# Patient Record
Sex: FEMALE | Race: WHITE
[De-identification: names, ages, dates, MRNs, and addresses within clinical notes are randomized per-mention and may not be internally consistent; named-entity substitution may affect disease eponyms.]

---

## 2018-05-18 ENCOUNTER — HOSPITAL ENCOUNTER (OUTPATIENT)
Dept: HOSPITAL 89 - MAMO | Age: 63
End: 2018-05-18
Attending: OBSTETRICS & GYNECOLOGY
Payer: COMMERCIAL

## 2018-05-18 DIAGNOSIS — Z12.31: Primary | ICD-10-CM

## 2018-05-18 PROCEDURE — 77067 SCR MAMMO BI INCL CAD: CPT

## 2018-05-18 PROCEDURE — 77063 BREAST TOMOSYNTHESIS BI: CPT

## 2018-05-18 NOTE — RADIOLOGY IMAGING REPORT
FACILITY: [facility] 

 

PATIENT NAME: MONAE ROMAN

: [id number]

MR: [id number]

V: [id number]

EXAM DATE: [id number]

ORDERING PHYSICIAN: ASHWIN MCFARLAND

TECHNOLOGIST: June Reed

 

PROCEDURE:BILATERAL DIGITAL SCREENING MAMMOGRAM WITH CAD ASSISTED 

INTERPRETATION & 3D TOMOSYNTHESIS 

 

COMPARISON:Prior mammograms 17, 11/4/15, 10/7/14, 13.

 

INDICATIONS:Screening

 

FINDINGS:  

Moderately dense fibroglandular tissue is seen throughout the breasts. 

The parenchymal pattern has remained stable allowing for difference in 

mammographic technique & patient positioning. There is no evidence of 

malignant appearing mass, malignant appearing calcifications or other 

secondary sign of malignancy in either breast.

 

DIAGNOSTIC CATEGORY 1--NEGATIVE.  

 

RECOMMENDATIONS:

ROUTINE MAMMOGRAM AND CLINICAL EVALUATION.   

 

IMPRESSION: 

BIRADS 1: Negative

No significant abnormality is seen.

 

 

 

 

 

 

 

 

 

Dictated by:  Mariia Judd M.D. on 2018 at 15:30   

Transcribed by: FIX on 2018 at 15:33    

Approved by:  Mariia Judd M.D. on 2018 at 15:46   

[facility]